# Patient Record
Sex: FEMALE | Race: WHITE
[De-identification: names, ages, dates, MRNs, and addresses within clinical notes are randomized per-mention and may not be internally consistent; named-entity substitution may affect disease eponyms.]

---

## 2018-05-10 ENCOUNTER — HOSPITAL ENCOUNTER (OUTPATIENT)
Dept: HOSPITAL 80 - FSGY | Age: 68
Discharge: HOME | End: 2018-05-10
Attending: INTERNAL MEDICINE
Payer: COMMERCIAL

## 2018-05-10 VITALS — SYSTOLIC BLOOD PRESSURE: 159 MMHG | DIASTOLIC BLOOD PRESSURE: 70 MMHG

## 2018-05-10 DIAGNOSIS — I10: ICD-10-CM

## 2018-05-10 DIAGNOSIS — D12.8: Primary | ICD-10-CM

## 2018-05-10 DIAGNOSIS — K62.1: ICD-10-CM

## 2018-05-10 DIAGNOSIS — Z86.73: ICD-10-CM

## 2018-05-10 DIAGNOSIS — R19.5: ICD-10-CM

## 2018-05-10 DIAGNOSIS — R10.13: ICD-10-CM

## 2018-05-10 DIAGNOSIS — K29.50: ICD-10-CM

## 2018-05-10 DIAGNOSIS — K57.30: ICD-10-CM

## 2018-05-10 DIAGNOSIS — K44.9: ICD-10-CM

## 2018-05-10 DIAGNOSIS — Z87.891: ICD-10-CM

## 2018-05-10 PROCEDURE — 0DB48ZX EXCISION OF ESOPHAGOGASTRIC JUNCTION, VIA NATURAL OR ARTIFICIAL OPENING ENDOSCOPIC, DIAGNOSTIC: ICD-10-PCS | Performed by: INTERNAL MEDICINE

## 2018-05-10 PROCEDURE — 0DB68ZX EXCISION OF STOMACH, VIA NATURAL OR ARTIFICIAL OPENING ENDOSCOPIC, DIAGNOSTIC: ICD-10-PCS | Performed by: INTERNAL MEDICINE

## 2018-05-10 PROCEDURE — 0DBP8ZX EXCISION OF RECTUM, VIA NATURAL OR ARTIFICIAL OPENING ENDOSCOPIC, DIAGNOSTIC: ICD-10-PCS | Performed by: INTERNAL MEDICINE

## 2018-05-10 PROCEDURE — 0DB98ZX EXCISION OF DUODENUM, VIA NATURAL OR ARTIFICIAL OPENING ENDOSCOPIC, DIAGNOSTIC: ICD-10-PCS | Performed by: INTERNAL MEDICINE

## 2018-05-10 NOTE — PDANEPAE
ANE History of Present Illness





blood in stool





ANE Past Medical History





- Cardiovascular History


Hx Hypertension: Yes


Hx Arrhythmias: No


Hx Chest Pain: No


Hx Coronary Artery / Peripheral Vascular Disease: No


Hx CHF / Valvular Disease: No


Hx Palpitations: No


Cardiovascular History Comment: POSS EXTRA HB





- Pulmonary History


Hx COPD: No


Hx Asthma/Reactive Airway Disease: No


Hx Recent Upper Respiratory Infection: No


Hx Oxygen in Use at Home: No


Hx Sleep Apnea: No


Sleep Apnea Screening Result - Last Documented: Negative





- Neurologic History


Hx Cerebrovascular Accident: Yes


Hx Seizures: No


Hx Dementia: No


Neurologic History Comment: CVA 1/2018 - L SIDE ARM & LEG WEAKNESS & SLOW SPEECH





- Endocrine History


Hx Diabetes: No





- Renal History


Hx Renal Disorders: No





- Liver History


Hx Hepatic Disorders: No





- Neurological & Psychiatric Hx


Hx Neurological and Psychiatric Disorders: No





- Cancer History


Hx Cancer: No





- Congenital Disorder History


Hx Congenital Disorders: No





- GI History


Hx Gastrointestinal Disorders: Yes


Gastrointestinal History Comment: ABD PAIN.  OCCAS REFLUX.  OCCAS DIFF 

SWALLOWING IF EATING TOO FAST





- Other Health History


Other Health History: NEG





- Chronic Pain History


Chronic Pain: No





- Surgical History


Prior Surgeries: TONSILLECTOMY.  D&C X2.  C SECTION.  SURGERY FOR HOLE ROOF OF 

MOUTH X5





ANE Review of Systems


Review of Systems: 








- Exercise capacity


METS (RN): 4 METS





ANE Patient History





- Allergies


Allergies/Adverse Reactions: 








No Known Allergies Allergy (Verified 05/10/18 08:19)


 








- Home Medications


Home Medications: 








Amlodipine Besylate  05/02/18 [Last Taken 05/09/18 20:00]


Aspirin  05/02/18 [Last Taken 05/03/18]


Atorvastatin Calcium  05/02/18 [Last Taken 05/10/18 06:45]


Herbals/Supplements -Info Only  05/02/18 [Last Taken 05/08/18]


Metoprolol Succinate  05/02/18 [Last Taken 05/10/18 06:45]


traZODone [traZODONE 100MG (*)] 100 mg PO HS 05/10/18 [Last Taken 04/26/18]








- NPO status


NPO Since - Liquids (Date): 05/09/18


NPO Since - Liquids (Time): 23:00


NPO Since - Solids (Date): 05/09/18


NPO Since - Solids (Time): 09:00





- Smoking Hx


Smoking Status: Former smoker





- Family Anes Hx


Family Hx Anesthesia Complications: NEG





ANE Labs/Vital Signs





- Vital Signs


Blood Pressure: 159/87


Heart Rate: 75


Respiratory Rate: 16


O2 Sat (%): 95


Height: 154.94 cm


Weight: 63.503 kg





ANE Physical Exam





- Airway


Neck exam: FROM


Mallampati Score: Class 2


Mouth exam: normal dental/mouth exam





- Pulmonary


Pulmonary: no respiratory distress





- Cardiovascular


Cardiovascular: regular rate and rhythym





- ASA Status


ASA Status: III





ANE Anesthesia Plan


Total IV Anesthesia: Yes

## 2018-05-10 NOTE — PDGENHP
History & Physical


Chief Complaint: CC: Blood in stool, epigastric pain


History of Present Illness: PMH Stroke HTN


Pertinent Past, Social, Family History: negative


Relevant Physical Exam: Lungs clear.  COR normal s1s2


Cardiorespiratory Assessment: normal.  Colon EGD today

## 2018-05-10 NOTE — GIREPORT
[facility]

Surgical Services - Endoscopy Department

_____________________________________________________________________________________________________
_______

Patient Name: Sissy Tolentino                    Procedure Date: 5/10/2018 8:24 AM

MRN: [id number]                                       Account Number: [id number]

Patient Type: Outpatient                             Attending  MD/ ER Physician: Cristian Barrera MD


_____________________________________________________________________________________________________
_______

 

Procedure:                    Colonoscopy

Indications:                  Positive Cologuard test

Providers:                    Cristian Barrera MD

Medicines:                    Sedation Required Anesthesia Staff Assistance

Complications:                No immediate complications.

Description of Procedure:     After obtaining informed consent, the scope was passed under direct vis
ion. 

                              Throughout the procedure, the patient's blood pressure, pulse, and oxyg
en 

                              saturations were monitored continuously. The Colonoscope with irrigatio
n 

                              channel was introduced through the anus and advanced to the cecum, 

                              identified by appendiceal orifice and ileocecal valve. The colonoscopy 
was 

                              performed without difficulty. The patient tolerated the procedure well.
 The 

                              quality of the bowel preparation was good. The ileocecal valve, appendi
ceal 

                              orifice, and rectum were photographed.

Findings:                     Multiple small and large-mouthed diverticula were found in the sigmoid 
colon.

                              Two sessile polyps were found in the rectum. The polyps were 3 to 4 mm 
in 

                              size. These polyps were removed with a cold biopsy forceps. Resection a
nd 

                              retrieval were complete.

                              The exam was otherwise without abnormality on direct and retroflexion v
iews.

Estimated Blood Loss:         Estimated blood loss: none.

Post Op Diagnosis:            - Diverticulosis in the sigmoid colon.

                              - Two 3 to 4 mm polyps in the rectum, removed with a cold biopsy forcep
s. 

                              Resected and retrieved.

                              - The examination was otherwise normal on direct and retroflexion views
.

Recommendation:               - Patient has a contact number available for emergencies. The signs and
 

                              symptoms of potential delayed complications were discussed with the pat
ient. 

                              Return to normal activities tomorrow. Written discharge instructions we
re 

                              provided to the patient.

                              - High fiber diet.

                              - Continue present medications.

                              - Await pathology results.

                              - Repeat colonoscopy is recommended. The colonoscopy date will be deter
mined 

                              after pathology results from today's exam become available for review.

                              - If the pathology report reveals adenomatous tissue, then repeat the 

                              colonoscopy for surveillance in 5 years.

                              - Thank you for allowing me to participate in the care of your patient.


Attending Participation:

     I personally performed the entire procedure.

 

Cristian Barrera MD

__________________

Cristian Barrera MD

5/10/2018 9:59:08 AM

This report has been signed electronicallyStmelita Barrera MD

Number of Addenda: 0

 

Note Initiated On: 5/10/2018 8:24 AM

Total Procedure Duration Time 0 hours 11 minutes 19 seconds 

[email or web address]

## 2018-05-10 NOTE — GIREPORT
[facility]

Surgical Services - Endoscopy Department

_____________________________________________________________________________________________________
_______

Patient Name: Sissy Tolentino                    Procedure Date: 5/10/2018 8:22 AM

MRN: [id number]                                       Account Number: [id number]

Patient Type: Outpatient                             Attending  MD/ ER Physician: Cristian Barrera MD


_____________________________________________________________________________________________________
_______

 

Procedure:                    Upper GI endoscopy

Indications:                  Epigastric abdominal pain, Dyspepsia

Providers:                    Cristian Barrera MD

Medicines:                    Sedation Required Anesthesia Staff Assistance

Complications:                No immediate complications.

Description of Procedure:     After obtaining informed consent, the endoscope was passed under direct
 

                              vision. Throughout the procedure, the patient's blood pressure, pulse, 
and 

                              oxygen saturations were monitored continuously. The Endoscope was intro
duced 

                              through the mouth, and advanced to the second part of duodenum. [place] [facility] 

                              endoscopy was accomplished without difficulty. The patient tolerated th
e 

                              procedure well.

Findings:                     LA Grade A (one or more mucosal breaks less than 5 mm, not extending be
tween 

                              tops of 2 mucosal folds) esophagitis with no bleeding was found 34 cm f
rom 

                              the incisors. Biopsies were taken with a cold forceps for histology.

                              A small hiatal hernia was present.

                              Patchy mild inflammation characterized by congestion (edema) and erythe
ma 

                              was found in the gastric antrum. Biopsies were taken with a cold forcep
s for 

                              histology.

                              The examined duodenum was normal. Biopsies for histology were taken wit
h a 

                              cold forceps for evaluation of celiac disease.

Estimated Blood Loss:         Estimated blood loss: none.

Post Op Diagnosis:            - LA Grade A reflux esophagitis. Biopsied.

                              - Small hiatal hernia.

                              - Chronic gastritis. Biopsied.

                              - Normal examined duodenum. Biopsied.

Recommendation:               - Await pathology results.

                              - Follow an antireflux regimen.

                              - Perform a colonoscopy today.

                              - Thank you for allowing me to participate in the care of your patient.


Attending Participation:

     I personally performed the entire procedure.

 

Cristian Barrera MD

__________________

Cristian Barrera MD

5/10/2018 9:58:44 AM

This report has been signed electronicallyStmelita Barrera MD

Number of Addenda: 0

 

Note Initiated On: 5/10/2018 8:22 AM

[email or web address]